# Patient Record
Sex: MALE | Race: WHITE | NOT HISPANIC OR LATINO | URBAN - METROPOLITAN AREA
[De-identification: names, ages, dates, MRNs, and addresses within clinical notes are randomized per-mention and may not be internally consistent; named-entity substitution may affect disease eponyms.]

---

## 2021-03-31 ENCOUNTER — IMMUNIZATION (OUTPATIENT)
Dept: PHARMACY | Facility: CLINIC | Age: 36
End: 2021-03-31

## 2021-03-31 DIAGNOSIS — Z23 NEED FOR VACCINATION: Primary | ICD-10-CM

## 2021-04-28 ENCOUNTER — IMMUNIZATION (OUTPATIENT)
Dept: PHARMACY | Facility: CLINIC | Age: 36
End: 2021-04-28

## 2021-04-28 DIAGNOSIS — Z23 NEED FOR VACCINATION: Primary | ICD-10-CM

## 2022-08-27 ENCOUNTER — HOSPITAL ENCOUNTER (EMERGENCY)
Facility: HOSPITAL | Age: 37
Discharge: HOME | End: 2022-08-27
Attending: EMERGENCY MEDICINE
Payer: MEDICAID

## 2022-08-27 VITALS
SYSTOLIC BLOOD PRESSURE: 135 MMHG | RESPIRATION RATE: 18 BRPM | DIASTOLIC BLOOD PRESSURE: 81 MMHG | TEMPERATURE: 99 F | HEIGHT: 68 IN | WEIGHT: 128 LBS | BODY MASS INDEX: 19.4 KG/M2 | HEART RATE: 105 BPM | OXYGEN SATURATION: 99 %

## 2022-08-27 DIAGNOSIS — S81.812A LACERATION OF CALF, LEFT, INITIAL ENCOUNTER: ICD-10-CM
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
## 2022-08-27 PROCEDURE — 12001 RPR S/N/AX/GEN/TRNK 2.5CM/<: CPT | Mod: ,,, | Performed by: PHYSICIAN ASSISTANT

## 2022-08-27 PROCEDURE — 99284 EMERGENCY DEPT VISIT MOD MDM: CPT | Mod: 25,,, | Performed by: PHYSICIAN ASSISTANT

## 2022-08-27 PROCEDURE — 99283 EMERGENCY DEPT VISIT LOW MDM: CPT | Mod: 25

## 2022-08-27 PROCEDURE — 12002 RPR S/N/AX/GEN/TRNK2.6-7.5CM: CPT

## 2022-08-27 PROCEDURE — 25000003 PHARM REV CODE 250: Performed by: PHYSICIAN ASSISTANT

## 2022-08-27 PROCEDURE — 99284 PR EMERGENCY DEPT VISIT,LEVEL IV: ICD-10-PCS | Mod: 25,,, | Performed by: PHYSICIAN ASSISTANT

## 2022-08-27 PROCEDURE — 12001 PR RESUPERF WND BODY <2.5CM: ICD-10-PCS | Mod: ,,, | Performed by: PHYSICIAN ASSISTANT

## 2022-08-27 RX ORDER — LIDOCAINE HYDROCHLORIDE 10 MG/ML
10 INJECTION INFILTRATION; PERINEURAL
Status: COMPLETED | OUTPATIENT
Start: 2022-08-27 | End: 2022-08-27

## 2022-08-27 RX ORDER — AMOXICILLIN AND CLAVULANATE POTASSIUM 875; 125 MG/1; MG/1
1 TABLET, FILM COATED ORAL 2 TIMES DAILY
Qty: 13 TABLET | Refills: 0 | Status: SHIPPED | OUTPATIENT
Start: 2022-08-27

## 2022-08-27 RX ORDER — AMOXICILLIN AND CLAVULANATE POTASSIUM 875; 125 MG/1; MG/1
1 TABLET, FILM COATED ORAL
Status: COMPLETED | OUTPATIENT
Start: 2022-08-27 | End: 2022-08-27

## 2022-08-27 RX ADMIN — AMOXICILLIN AND CLAVULANATE POTASSIUM 1 TABLET: 875; 125 TABLET, FILM COATED ORAL at 10:08

## 2022-08-27 RX ADMIN — LIDOCAINE HYDROCHLORIDE 10 ML: 10 INJECTION, SOLUTION INFILTRATION; PERINEURAL at 09:08

## 2022-08-27 NOTE — Clinical Note
"Ceasar "Ceasar" Joshua was seen and treated in our emergency department on 8/27/2022.  He may return to work on 08/29/2022.       If you have any questions or concerns, please don't hesitate to call.      Austyn Chavez PA-C"

## 2022-08-28 NOTE — ED PROVIDER NOTES
Encounter Date: 8/27/2022       History     Chief Complaint   Patient presents with    Animal Bite     Pt c/o dog bite to left leg. 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 Bleeding controlled in triage. Gauze and coban applied in triage.      The history is provided by the patient and medical records. No  was used.     Ceasar Lewis is a 36 y.o. male with no reported medical history presenting to the ED with the chief complaint of dog bite to left leg.     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 Reports having puncture wounds. Denies numbness, paresthesias, extremity weakness. No blood thinner use or daily medication use. Reports last tetanus was updated 5 years ago. Reports dog was up to date on its shots and was not displaying abnormal behavior.     Review of patient's allergies indicates:  No Known Allergies  History reviewed. No pertinent past medical history.  History reviewed. No pertinent surgical history.  History reviewed. No pertinent family history.     Review of Systems   Constitutional:  Negative for fever.   HENT:  Negative for sore throat.    Eyes:  Negative for pain.   Respiratory:  Negative for shortness of breath.    Cardiovascular:  Negative for chest pain.   Gastrointestinal:  Negative for nausea.   Genitourinary:  Negative for dysuria.   Musculoskeletal:  Positive for myalgias. Negative for back pain.   Skin:  Positive for wound. Negative for rash.   Neurological:  Negative for weakness.   Hematological:  Does not bruise/bleed easily.     Physical Exam     Initial Vitals [08/27/22 2103]   BP Pulse Resp Temp SpO2   135/81 105 18 98.5 °F (36.9 °C) 99 %      MAP       --         Physical Exam    Constitutional: He appears well-developed and well-nourished. He is not diaphoretic. No distress.   HENT:   Head: Normocephalic and atraumatic.    Mouth/Throat: Oropharynx is clear and moist.   Eyes: EOM are normal. Pupils are equal, round, and reactive to light.   Neck:   Normal range of motion.  Cardiovascular:  Normal rate and regular rhythm.           Pulmonary/Chest: No respiratory distress.   Speaking full sentences without difficulty. No accessory muscle use.   Musculoskeletal:         General: Normal range of motion.      Cervical back: Normal range of motion.      Comments: 3 puncture wounds to L calf. Posterior wound deep and measures about 3 cm.   Full A/P ROM of extremities     Neurological: He is alert and oriented to person, place, and time.   No focal weakness or sensory deficit   Skin: Skin is warm and dry. No rash noted.       Left calf:    Left calf:      ED Course   Lac Repair    Date/Time: 8/27/2022 10:23 PM  Performed by: Austyn Chavez PA-C  Authorized by: Nancie Betts MD     Consent:     Consent obtained:  Verbal    Consent given by:  Patient    Risks discussed:  Infection and poor cosmetic result  Universal protocol:     Patient identity confirmed:  Verbally with patient  Anesthesia:     Anesthesia method:  Local infiltration  Labs Reviewed   HIV 1 / 2 ANTIBODY   HEPATITIS C ANTIBODY          Imaging Results    None          Medications   LIDOcaine HCL 10 mg/ml (1%) injection 10 mL (10 mLs Infiltration Given 8/27/22 2146)   amoxicillin-clavulanate 875-125mg per tablet 1 tablet (1 tablet Oral Given 8/27/22 2244)     Medical Decision Making:   History:   Old Medical Records: I decided to obtain old medical records.  Old Records Summarized: records from clinic visits.     APC / Resident Notes:   36 y.o. male with no reported medical history presenting to the ED c/o dog bite to left leg occurring 1 hour PTA. DDx includes but not limited to laceration, foreign body, arterial injury, nerve injury, fracture or tendon injury.             Left calf wounds extensively irrigated at bedside. Wound investigated without visible foreign body.  Posterior calf laceration loosely approximated with 2 sutures as described in procedure note above. Augmentin given for infection prophylaxis. Wound care instructions provided. Patient expresses understanding and agreeable to the plan. Return to ED precautions given for new, worsening, or concerning symptoms. I have discussed the care of this patient with my supervising physician.       Clinical Impression:   Final diagnoses:  [W54.0XXA] Dog bite, initial encounter (Primary)  [S81.652A] Laceration of calf, left, initial encounter      ED Disposition Condition    Discharge Stable          ED Prescriptions       Medication Sig Dispense Start Date End Date Auth. Provider    amoxicillin-clavulanate 875-125mg (AUGMENTIN) 875-125 mg per tablet Take 1 tablet by mouth 2 (two) times daily. 13 tablet 8/27/2022 -- Austyn Chavez PA-C          Follow-up Information       Follow up With Specialties Details Why Contact Info Additional Information    [facility]   [address]  [place] [id number] [phone] [facility] Please park in surface lot and check in at central registration desk             Austyn Chavez PA-C  08/27/22 3918

## 2022-08-28 NOTE — DISCHARGE INSTRUCTIONS
Diagnosis: Laceration    --Sutures need to come out after 8 days (9/4/22).   --Take the prescribed Augmentin to completion to ensure your wound does not become infected.  --Keep the original dressing in place for the next 24 hours and then change twice daily thereafter. Apply over-the-counter antibiotic ointment (Neosporin, Neomycin, Bacitracin, etc) when changing the dressing.  --You may cleanse daily by rinsing gently with soap and water. Change your dressing if it becomes soiled or wet.  --It is okay to shower. Do not soak the wound in water. This includes swimming pools, hot tubs, or while washing dishes.   --If your stitches or staples require removal, you may seek care at your primary care, urgent care, or the emergency room. [facility] will not charge you for an additional visit for stiches or staple removal regardless of your insurance status.   --Apply sunscreen to the area after sutures removed for the next year as sunscreen may aid in reducing the appearance of scaring  --Return to the emergency department if you develop fevers, spreading redness or streaking redness, severe pain, swelling, or leakage of pus from the wound.

## 2022-08-28 NOTE — ED NOTES
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 Reports dog is up to date on all shots. Last tetanus shot was 4 years ago.     AAOX4. Even and unlabored respirations noted. Ambulatory to intake. No bleeding to bite area.

## 2022-09-04 ENCOUNTER — HOSPITAL ENCOUNTER (EMERGENCY)
Facility: HOSPITAL | Age: 37
Discharge: HOME | End: 2022-09-04
Attending: EMERGENCY MEDICINE
Payer: MEDICAID

## 2022-09-04 VITALS
WEIGHT: 125 LBS | BODY MASS INDEX: 18.94 KG/M2 | TEMPERATURE: 98 F | OXYGEN SATURATION: 98 % | HEIGHT: 68 IN | RESPIRATION RATE: 18 BRPM | DIASTOLIC BLOOD PRESSURE: 91 MMHG | HEART RATE: 88 BPM | SYSTOLIC BLOOD PRESSURE: 143 MMHG

## 2022-09-04 DIAGNOSIS — Z51.89 ENCOUNTER FOR POST-TRAUMATIC WOUND CHECK: Primary | ICD-10-CM

## 2022-09-04 DIAGNOSIS — Z48.02 ENCOUNTER FOR REMOVAL OF SUTURES: ICD-10-CM

## 2022-09-04 PROCEDURE — 99024 POSTOP FOLLOW-UP VISIT: CPT | Mod: ,,, | Performed by: EMERGENCY MEDICINE

## 2022-09-04 PROCEDURE — 99024 PR POST-OP FOLLOW-UP VISIT: ICD-10-PCS | Mod: ,,, | Performed by: EMERGENCY MEDICINE

## 2022-09-04 PROCEDURE — 99281 EMR DPT VST MAYX REQ PHY/QHP: CPT

## 2022-09-04 NOTE — ED PROVIDER NOTES
Encounter Date: 9/4/2022    SCRIBE #1 NOTE: I, Eva Valadez, am scribing for, and in the presence of,  Kimo Borges DO.. I have scribed the following portions of the note - Other sections scribed: HPI, ROS.     History     Chief Complaint   Patient presents with    Leg Pain     L lower leg pain and swelling from dog bite, has sutures     The history is provided by the patient and medical records. No  was used.   Time patient was seen by the provider: 9:57 AM      Ceasar Lewis is a 36 y.o. male with no significant past medical history presents to the ED with a complaint of left lower extremity tenderness. The patient states that he has been experiencing left leg tenderness for the past 1 day. The patient noticed recent swelling in his left leg after receiving sutures from a dog bite 8 days ago that was minimal. No other exacerbating or alleviating factors. Patient denies fevers, chills, or other associated symptoms.  He denies any discharge, drainage, redness or increased pain from the site.  Sutures in place.    Review of patient's allergies indicates:  No Known Allergies  No past medical history on file.  No past surgical history on file.  No family history on file.     Review of Systems   Constitutional:  Negative for chills and fever.   HENT:  Negative for sore throat.    Respiratory:  Negative for shortness of breath.    Cardiovascular:  Negative for chest pain.   Gastrointestinal:  Negative for nausea.   Genitourinary:  Negative for dysuria.   Musculoskeletal:  Positive for joint swelling and myalgias. Negative for back pain.   Skin:  Negative for rash.   Neurological:  Negative for weakness.   Hematological:  Does not bruise/bleed easily.     Physical Exam     Initial Vitals [09/04/22 0921]   BP Pulse Resp Temp SpO2   (!) 143/91 100 18 98.4 °F (36.9 °C) 98 %      MAP       --         Physical Exam    Nursing note and vitals reviewed.      Gen/Constitutional: Interactive. No acute  distress  Head: Normocephalic, Atraumatic  Neck: supple, no masses or LAD  Eyes: PERRLA, conjunctiva clear  Ears, Nose and Throat: No rhinorrhea or stridor.  Cardiac: Reg Rhythm, No murmur  Pulmonary: CTA Bilat, no wheezes, rhonchi, rales.  GI: Abdomen soft, non-tender, non-distended; no rebound or guarding  : No CVA tenderness.  Musculoskeletal: Extremities warm, well perfused, no erythema, no edema  Left lower extremity:  Healed suture/laceration site without surrounding erythema, edema or discharge/drainage.  Two sutures in place.  Skin: No rashes  Neuro: Alert and Oriented x 3; No focal motor or sensory deficits.    Psych: Normal affect    ED Course   Suture Removal    Date/Time: 9/4/2022 10:08 AM  Location procedure was performed: [facility] EMERGENCY DEPARTMENT  Performed by: Kimo Borges DO  Authorized by: Kimo Borges DO   Pre-operative diagnosis: Dog bite  Post-operative diagnosis: Suture removal of dog bite laceration  Body area: lower extremity  Location details: left lower leg  Description of findings: Healed, 2 sutures in place   Wound Appearance: clean, well healed, normal color, tender and no drainage  Sutures Removed: 2  Post-removal: dressing applied  Facility: sutures placed in this facility  Patient tolerance: Patient tolerated the procedure well with no immediate complications      Labs Reviewed   HIV 1 / 2 ANTIBODY   HEPATITIS C ANTIBODY          Imaging Results    None          Medications - No data to display  Medical Decision Making:   History:   Old Medical Records: I decided to obtain old medical records.  Initial Assessment:   Ceasar Lewis is a 36 y.o. male with no significant past medical history presents to the ED with a complaint of left lower extremity pain. Onset 1 day.  Differential Diagnosis:   Encounter for suture removal, healed laceration site, infection, cellulitis, abscess  Independently Interpreted Test(s):   I have ordered and independently interpreted X-rays - see prior  notes.  Clinical Tests:   Lab Tests: Ordered and Reviewed  Radiological Study: Ordered and Reviewed        Afebrile vital signs are stable.  Physical exam findings remarkable for 2 sutures in place on a repaired dog bite laceration site from 8 days ago.  No secondary signs of infection, no cellulitis, abscess, discharge or drainage.  Slightly tender to palpation.  Soft compartments throughout, 2+ distal pulses, sensory intact to light touch.  Sutures were removed per above procedure note, patient discharged with strict ED precautions return instructions and follow-up plan as needed.  Patient agreeable to discharge plan. Strict ED precautions and return instructions discussed at length and patient verbalized understanding. All questions were answered and ample time was given for questions.      Complexity:  Moderate    Scribe Attestation:   Scribe #1: I performed the above scribed service and the documentation accurately describes the services I performed. I attest to the accuracy of the note.             I, Dr. Kimo Borges, personally performed the services described in this documentation. All medical record entries made by the scribe were at my direction and in my presence.  I have reviewed the chart and agree that the record reflects my personal performance and is accurate and complete.     Clinical Impression:   Final diagnoses:  [Z51.89] Encounter for post-traumatic wound check (Primary)  [Z48.02] Encounter for removal of sutures      ED Disposition Condition    Discharge Stable          ED Prescriptions    None       Follow-up Information       Follow up With Specialties Details Why Contact Info    [facility]  Schedule an appointment as soon as possible for a visit in 1 week As needed, If symptoms worsen [address] [facility] [address]  [phone]            Kimo Borges DO, FAAEM  Emergency Staff Physician   Dept of Emergency Medicine   [facility]  Spectralink:  [phone]        Disclaimer: This note has been generated using voice-recognition software. There may be typographical errors that have been missed during proof-reading.       Kimo Borges DO  09/04/22 1016

## 2022-09-04 NOTE — DISCHARGE INSTRUCTIONS
Today, your evaluation shows a healing wound.  Your sutures were removed today.  Leave your dressing in place for 24-36 hours.  If he see signs of infection including fevers, chills, streaking, redness extending above or below, tight calf or any concerns follow-up sooner or return emergency department.    Our goal in the emergency department is to always give you outstanding care and exceptional service. You may receive a survey by mail or e-mail in the next week regarding your experience in our ED. We would greatly appreciate your completing and returning the survey. Your feedback provides us with a way to recognize our staff who give very good care and it helps us learn how to improve when your experience was below our aspiration of excellence.